# Patient Record
Sex: MALE | Race: WHITE | NOT HISPANIC OR LATINO | URBAN - METROPOLITAN AREA
[De-identification: names, ages, dates, MRNs, and addresses within clinical notes are randomized per-mention and may not be internally consistent; named-entity substitution may affect disease eponyms.]

---

## 2021-03-04 ENCOUNTER — OFFICE VISIT (OUTPATIENT)
Dept: URBAN - METROPOLITAN AREA CLINIC 107 | Facility: CLINIC | Age: 56
End: 2021-03-04
Payer: COMMERCIAL

## 2021-03-04 ENCOUNTER — WEB ENCOUNTER (OUTPATIENT)
Dept: URBAN - METROPOLITAN AREA CLINIC 107 | Facility: CLINIC | Age: 56
End: 2021-03-04

## 2021-03-04 ENCOUNTER — LAB OUTSIDE AN ENCOUNTER (OUTPATIENT)
Dept: URBAN - METROPOLITAN AREA CLINIC 107 | Facility: CLINIC | Age: 56
End: 2021-03-04

## 2021-03-04 VITALS
HEART RATE: 102 BPM | SYSTOLIC BLOOD PRESSURE: 140 MMHG | RESPIRATION RATE: 18 BRPM | TEMPERATURE: 98.5 F | DIASTOLIC BLOOD PRESSURE: 93 MMHG | HEIGHT: 70 IN | BODY MASS INDEX: 42.23 KG/M2 | WEIGHT: 295 LBS

## 2021-03-04 DIAGNOSIS — Z12.11 COLON CANCER SCREENING: ICD-10-CM

## 2021-03-04 DIAGNOSIS — R74.8 ELEVATED LIVER ENZYMES: ICD-10-CM

## 2021-03-04 DIAGNOSIS — K76.0 FATTY LIVER DISEASE, NONALCOHOLIC: ICD-10-CM

## 2021-03-04 PROCEDURE — 99203 OFFICE O/P NEW LOW 30 MIN: CPT | Performed by: INTERNAL MEDICINE

## 2021-03-04 RX ORDER — LOSARTAN POTASSIUM 25 MG/1
1 TABLET TABLET ORAL ONCE A DAY
Status: ACTIVE | COMMUNITY

## 2021-03-04 RX ORDER — SODIUM, POTASSIUM,MAG SULFATES 17.5-3.13G
354 ML SOLUTION, RECONSTITUTED, ORAL ORAL ONCE
Qty: 354 ML | Refills: 0 | OUTPATIENT
Start: 2021-03-04 | End: 2021-03-05

## 2021-03-04 NOTE — HPI-OTHER HISTORIES
pleasant 55-year-old who states that he had some mild abnormalities of his liver enzymes in the spring but has gotten much worse with his last blood work.  He's been sent by his family physician to evaluate further.  There is a question of possible fatty liver 20 years ago.  This is when he was trying to donate blood.  He currently has some heartburn symptoms but rarely.  He's had no dysphagia, nausea or vomiting or abdominal pain.  His 2-3 bowel movements per day without any blood or melena.  He never had a colonoscopy examination.  He denies any herbal medication use or any new medications.  He uses NSAIDs about every couple of weeks.  He does drink beer usually on the weekends.  A couple of cans.  There is no history of hepatitis or yellow jaundice.  He has gained about 25 pounds since the beginning of last year. he had a hemoglobin of 4.6, WBC of 15.8 and platelet count 334,000.  Total bili 0.5, AST 58, ALT 11, phosphatase 64

## 2021-03-04 NOTE — PHYSICAL EXAM GASTROINTESTINAL
Abdomen , soft, nontender, nondistended , no guarding or rigidity , no masses palpable , normal bowel sounds , Liver and Spleen , liver edge is one finger breaths below the right costal margin and it does extend across the midline.  There is no splenomegaly, liver nontender , spleen not palpable

## 2021-03-15 ENCOUNTER — OFFICE VISIT (OUTPATIENT)
Dept: URBAN - METROPOLITAN AREA TELEHEALTH 2 | Facility: TELEHEALTH | Age: 56
End: 2021-03-15
Payer: COMMERCIAL

## 2021-03-15 DIAGNOSIS — E66.8 OTHER OBESITY: ICD-10-CM

## 2021-03-15 DIAGNOSIS — K76.0 FATTY (CHANGE OF) LIVER, NOT ELSEWHERE CLASSIFIED: ICD-10-CM

## 2021-03-15 PROCEDURE — 97802 MEDICAL NUTRITION INDIV IN: CPT | Performed by: DIETITIAN, REGISTERED

## 2021-03-15 RX ORDER — LOSARTAN POTASSIUM 25 MG/1
1 TABLET TABLET ORAL ONCE A DAY
Status: ACTIVE | COMMUNITY

## 2021-03-22 ENCOUNTER — TELEPHONE ENCOUNTER (OUTPATIENT)
Dept: URBAN - METROPOLITAN AREA CLINIC 113 | Facility: CLINIC | Age: 56
End: 2021-03-22

## 2021-03-31 ENCOUNTER — OFFICE VISIT (OUTPATIENT)
Dept: URBAN - METROPOLITAN AREA TELEHEALTH 2 | Facility: TELEHEALTH | Age: 56
End: 2021-03-31
Payer: COMMERCIAL

## 2021-03-31 DIAGNOSIS — K76.0 FATTY (CHANGE OF) LIVER, NOT ELSEWHERE CLASSIFIED: ICD-10-CM

## 2021-03-31 PROCEDURE — 97803 MED NUTRITION INDIV SUBSEQ: CPT | Performed by: DIETITIAN, REGISTERED

## 2021-03-31 RX ORDER — LOSARTAN POTASSIUM 25 MG/1
1 TABLET TABLET ORAL ONCE A DAY
Status: ACTIVE | COMMUNITY

## 2021-05-04 ENCOUNTER — TELEPHONE ENCOUNTER (OUTPATIENT)
Dept: URBAN - METROPOLITAN AREA CLINIC 113 | Facility: CLINIC | Age: 56
End: 2021-05-04

## 2021-05-04 RX ORDER — SODIUM, POTASSIUM,MAG SULFATES 17.5-3.13G
354 ML SOLUTION, RECONSTITUTED, ORAL ORAL ONCE
Qty: 354 ML | Refills: 0
Start: 2021-03-04

## 2021-05-07 ENCOUNTER — CLAIMS CREATED FROM THE CLAIM WINDOW (OUTPATIENT)
Dept: URBAN - METROPOLITAN AREA CLINIC 4 | Facility: CLINIC | Age: 56
End: 2021-05-07
Payer: COMMERCIAL

## 2021-05-07 ENCOUNTER — OFFICE VISIT (OUTPATIENT)
Dept: URBAN - METROPOLITAN AREA SURGERY CENTER 25 | Facility: SURGERY CENTER | Age: 56
End: 2021-05-07
Payer: COMMERCIAL

## 2021-05-07 DIAGNOSIS — K63.89 STENOSIS OF ILEOCECAL VALVE: ICD-10-CM

## 2021-05-07 DIAGNOSIS — K63.89 BACTERIAL OVERGROWTH SYNDROME: ICD-10-CM

## 2021-05-07 DIAGNOSIS — Z12.11 COLON CANCER SCREENING: ICD-10-CM

## 2021-05-07 PROCEDURE — 88342 IMHCHEM/IMCYTCHM 1ST ANTB: CPT | Performed by: PATHOLOGY

## 2021-05-07 PROCEDURE — 88341 IMHCHEM/IMCYTCHM EA ADD ANTB: CPT | Performed by: PATHOLOGY

## 2021-05-07 PROCEDURE — 45385 COLONOSCOPY W/LESION REMOVAL: CPT | Performed by: INTERNAL MEDICINE

## 2021-05-07 PROCEDURE — 88305 TISSUE EXAM BY PATHOLOGIST: CPT | Performed by: PATHOLOGY

## 2021-05-07 PROCEDURE — G8907 PT DOC NO EVENTS ON DISCHARG: HCPCS | Performed by: INTERNAL MEDICINE

## 2021-05-07 RX ORDER — LOSARTAN POTASSIUM 25 MG/1
1 TABLET TABLET ORAL ONCE A DAY
Status: ACTIVE | COMMUNITY

## 2021-05-07 RX ORDER — SODIUM, POTASSIUM,MAG SULFATES 17.5-3.13G
354 ML SOLUTION, RECONSTITUTED, ORAL ORAL ONCE
Qty: 354 ML | Refills: 0 | Status: ACTIVE | COMMUNITY
Start: 2021-03-04

## 2021-06-02 ENCOUNTER — OFFICE VISIT (OUTPATIENT)
Dept: URBAN - METROPOLITAN AREA CLINIC 113 | Facility: CLINIC | Age: 56
End: 2021-06-02
Payer: COMMERCIAL

## 2021-06-02 ENCOUNTER — DASHBOARD ENCOUNTERS (OUTPATIENT)
Age: 56
End: 2021-06-02

## 2021-06-02 VITALS
DIASTOLIC BLOOD PRESSURE: 89 MMHG | HEIGHT: 70 IN | SYSTOLIC BLOOD PRESSURE: 148 MMHG | BODY MASS INDEX: 39.65 KG/M2 | TEMPERATURE: 97.8 F | HEART RATE: 78 BPM | WEIGHT: 277 LBS

## 2021-06-02 DIAGNOSIS — R74.8 ELEVATED LIVER ENZYMES: ICD-10-CM

## 2021-06-02 DIAGNOSIS — K76.0 FATTY LIVER DISEASE, NONALCOHOLIC: ICD-10-CM

## 2021-06-02 DIAGNOSIS — Z12.11 COLON CANCER SCREENING: ICD-10-CM

## 2021-06-02 PROBLEM — 197315008: Status: ACTIVE | Noted: 2021-03-04

## 2021-06-02 PROBLEM — 305058001: Status: ACTIVE | Noted: 2021-03-04

## 2021-06-02 PROCEDURE — 99213 OFFICE O/P EST LOW 20 MIN: CPT | Performed by: INTERNAL MEDICINE

## 2021-06-02 RX ORDER — SODIUM, POTASSIUM,MAG SULFATES 17.5-3.13G
354 ML SOLUTION, RECONSTITUTED, ORAL ORAL ONCE
Qty: 354 ML | Refills: 0 | Status: ON HOLD | COMMUNITY
Start: 2021-03-04

## 2021-06-02 RX ORDER — LOSARTAN POTASSIUM 25 MG/1
1 TABLET TABLET ORAL ONCE A DAY
Status: ACTIVE | COMMUNITY

## 2021-06-02 RX ORDER — ROSUVASTATIN CALCIUM 20 MG/1
1 TABLET UNKNOWN DOSAGE TABLET, FILM COATED ORAL ONCE A DAY
Status: ACTIVE | COMMUNITY

## 2021-06-02 NOTE — HPI-OTHER HISTORIES
pleasant 55-year-old who states that he had some mild abnormalities of his liver enzymes in the spring but has gotten much worse with his last blood work.  He's been sent by his family physician to evaluate further.  There is a question of possible fatty liver 20 years ago.  This is when he was trying to donate blood.  He currently has some heartburn symptoms but rarely.  He's had no dysphagia, nausea or vomiting or abdominal pain.  His 2-3 bowel movements per day without any blood or melena.  He never had a colonoscopy examination.  He denies any herbal medication use or any new medications.  He uses NSAIDs about every couple of weeks.  He does drink beer usually on the weekends.  A couple of cans.  There is no history of hepatitis or yellow jaundice.  He has gained about 25 pounds since the beginning of last year. he had a hemoglobin of 4.6, WBC of 15.8 and platelet count 334,000.  Total bili 0.5, AST 58, , phosphatase 64 He had blood work done on 3/18/2021 that revealed hemoglobin of 15.8, WBC of 5.5 platelet count of 315,000.  Sodium 140, potassium 4.4 BUN 13 creatinine 0.91 total bili 0.4 AST 61  alkaline phosphatase of 56.  Immunoglobulins IgG, IgM and IgA are normal.  PT/INR is 1.0.  Anti-smooth muscle antibody, antimitochondrial antibody, antinuclear antibody, and anti-LK M are all negative.  Alpha-1 antitrypsin levels 132 TTG is negative.  Iron saturation is 22% ferritin is 724.  Hepatitis A total antibody is positive hepatitis A IgM is negative hepatitis C is negative hepatitis B surface antibody is negative hepatitis B core antibody total is negative. He had a colonoscopy on 5/7/2021 that revealed a normal terminal ileum, small grade 1 internal hemorrhoids, a few sigmoid diverticuli.  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 He has been doing well and has lost weight.  Unfortunately he still is drinking some beer.  He has no heartburn, abdominal pain, dysphagia.  There is no nausea or vomiting.  He has a bowel movement every day and has been no blood or melena.  He did speak with the dietitian regarding fatty liver disease and weight loss

## 2021-06-07 LAB
ALBUMIN: 4.6
ALKALINE PHOSPHATASE: 62
ALT (SGPT): 42
AST (SGOT): 30
BILIRUBIN, DIRECT: 0.17
BILIRUBIN, TOTAL: 0.5
HBSAG SCREEN: NEGATIVE
HEREDITARY  HEMOCHROMATOSIS: (no result)
PROTEIN, TOTAL: 7.4

## 2021-10-05 ENCOUNTER — OFFICE VISIT (OUTPATIENT)
Dept: URBAN - METROPOLITAN AREA CLINIC 113 | Facility: CLINIC | Age: 56
End: 2021-10-05